# Patient Record
Sex: MALE | Race: BLACK OR AFRICAN AMERICAN
[De-identification: names, ages, dates, MRNs, and addresses within clinical notes are randomized per-mention and may not be internally consistent; named-entity substitution may affect disease eponyms.]

---

## 2018-10-03 ENCOUNTER — HOSPITAL ENCOUNTER (OUTPATIENT)
Dept: HOSPITAL 35 - PAIN | Age: 53
Discharge: HOME | End: 2018-10-03
Attending: ANESTHESIOLOGY
Payer: OTHER GOVERNMENT

## 2018-10-03 VITALS — SYSTOLIC BLOOD PRESSURE: 131 MMHG | DIASTOLIC BLOOD PRESSURE: 90 MMHG

## 2018-10-03 VITALS — WEIGHT: 200.2 LBS | BODY MASS INDEX: 28.66 KG/M2 | HEIGHT: 70 IN

## 2018-10-03 DIAGNOSIS — G89.29: ICD-10-CM

## 2018-10-03 DIAGNOSIS — Z79.899: ICD-10-CM

## 2018-10-03 DIAGNOSIS — M51.16: Primary | ICD-10-CM

## 2018-10-03 DIAGNOSIS — M47.27: ICD-10-CM

## 2018-10-03 DIAGNOSIS — M46.96: ICD-10-CM

## 2019-02-05 ENCOUNTER — HOSPITAL ENCOUNTER (OUTPATIENT)
Dept: HOSPITAL 35 - PAIN | Age: 54
Discharge: HOME | End: 2019-02-05
Attending: ANESTHESIOLOGY
Payer: OTHER GOVERNMENT

## 2019-02-05 VITALS — SYSTOLIC BLOOD PRESSURE: 123 MMHG | DIASTOLIC BLOOD PRESSURE: 82 MMHG

## 2019-02-05 VITALS — WEIGHT: 203.2 LBS | HEIGHT: 70 IN | BODY MASS INDEX: 29.09 KG/M2

## 2019-02-05 DIAGNOSIS — Z79.899: ICD-10-CM

## 2019-02-05 DIAGNOSIS — M50.10: Primary | ICD-10-CM

## 2019-02-05 DIAGNOSIS — Z98.890: ICD-10-CM

## 2019-02-05 DIAGNOSIS — M48.02: ICD-10-CM

## 2019-02-05 DIAGNOSIS — M47.22: ICD-10-CM

## 2019-02-05 NOTE — NUR
Pain Clinic Assessment:
 
1. History of Osteoarthritis:
Not Applicable
   History of Rheumatoid Arthritis:
Not Applicable
 
2. Height: 5 ft. 10 in. 177.8 cm.
   Weight: 203.2 lb.  oz. 92.171 kg.
   Patient's BMI: 29.2
 
3. Vital Signs:
   BP: 123/82 Pulse: 70 Resp: 14
   Temp:  02 Sat: 100 ECG Mon:
 
4. Pain Intensity: 4
 
5. Fall Risk:
   Dizziness: Y  Needs help standing or walking: N
   Fallen in the last 3 months: Y
   Fall risk comments:
 
 
6. Patient on Blood Thinner: None
 
7. History of Hypertension: N
 
8. Opioid Therapy greater than 6 weeks: N
   Opiate Contract Signed:
 
9. Risk Assessment Tool Provided:
 
10. Functional Assessment Tool:
 
11. Recreational Drug Use: Never Drug Type:
    Tobacco Use: Never Smoker Tobacco Type:
       Amount or Packs/day:  How Many Years:
    Alcohol Use: Yes  Frequency:  Quant:

## 2019-02-06 NOTE — HPC
[facility]
[place]   [address]                     PAIN MANAGEMENT CONSULTATION  
_______________________________________________________________________________
 
Name:       ISABELL EDGAR       Room #:                     REG [facility] 
MNAEEM.#:      [id number]                       Account #:      [id number]  
Admission:  02/05/19    Attend Phys:    Santos Ledesma DO  
Discharge:              Date of Birth:  [birth year]  
                                                          Report #: [id number]
                                                                    [id number]   
_______________________________________________________________________________
THIS REPORT FOR:   //name//                          
 
CC: Dr. Lisa RODRIGUEZ
 
REFERRING PHYSICIAN:  Dr. Rosina Rodriguez at [facility]. 
 
HISTORY OF PRESENT ILLNESS:  As you know, the patient is a 53-year-old male who
was initially seen in our clinic for concerns of low back pain for which the
patient underwent treatment with epidural injections on 08/15/2018 and again on
10/03/2018 with improvement in symptoms.  He has been lost followup visit until
today 02/05/2019, returning in followup visit per the request of his
neurosurgeon to undergo a cervical epidural injection under fluoroscopic
guidance to address cervical radicular symptoms secondary to a displaced large
left eccentric central disk protrusion at the C5-C6 level.  The patient denies
injury or trauma that may have led to symptom development.  He returns today per
the request of his neurosurgeon to undergo cervical epidural injection under
fluoroscopic guidance to address cervical radicular symptoms.
 
ALLERGIES:  No known drug allergies.
 
CURRENT MEDICATIONS:  Gabapentin, trazodone, tizanidine, nortriptyline,
meloxicam, lidocaine, cholecalciferol, acetaminophen and duloxetine.
 
SOCIAL HISTORY:  The patient denies tobacco, IV or illicit drug use.  Admits to
occasional alcohol beverage.  He is working, not receiving workmen's
compensation, unaccompanied today.
 
IMAGING DATA:  MRI of the cervical spine obtained on 09/26/2018 shows moderate
focal disk protrusion at the C4-C5 level with mild left uncovertebral spurring,
mild left neural foraminal stenosis, mild central canal stenosis at C5-C6, there
is a large left eccentric central disk protrusion, left greater than right
uncovertebral spurring, protrusion abuts the ventral surface of the spinal cord
which slightly deforms and contributes to moderate central canal stenosis. 
Dural sac is measured at 8.5 mm, moderate left neural foraminal stenosis with
minimal right neural foraminal stenosis.  There is also mild disk bulge at
C6-C7, minimal left neural foraminal narrowing, right foramen is patent,
borderline spinal canal stenosis; C7-T1, moderate central disk protrusion,
ventral surface of the cord is abutted but only contributes to mild spinal canal
stenosis at 9 mm.
 
PHYSICAL EXAMINATION:
VITAL SIGNS:  Blood pressure 123/82, pulse 70 and respiratory rate 14 and
unlabored.  The patient is 100% on room air.  Height 5 feet 10 inches tall,
 
 
 
[place] [address]                     PAIN MANAGEMENT CONSULTATION  
_______________________________________________________________________________
 
Name:       ISABELL EDGAR       Room #:                     REG CL 
M.CHANG.#:      [id number]                       Account #:      [id number]  
Admission:  02/05/19    Attend Phys:    Santos Ledesma DO  
Discharge:              Date of Birth:  [birth year]  
                                                          Report #: [id number]
                                                                    [id number]   
_______________________________________________________________________________
weight 203.2 pounds and BMI calculated 29.2.
GENERAL:  Well-developed, well-nourished, well-hydrated 53-year-old male
appearing stated age, placing current pain score 4/10.
HEENT:  Normocephalic and atraumatic.  Pupils equal, round and reactive to
light.  Extraocular muscles are intact.  Sclerae nonicteric without injection.
NEUROLOGICAL:  Cranial nerves 2 through 12 grossly intact.  Speech is fluent. 
The patient deemed a good historian.
LUNGS:  Clear.  No wheeze, rhonchi or rales.
CARDIOVASCULAR:  Regular.  No appreciable gallop and no rub.
ABDOMEN:  Soft.
EXTREMITIES:  Show no clubbing, no cyanosis and no edema.
MUSCULOSKELETAL:  Upper extremity strength appears symmetrical 5/5, muscle bulk
and tone equal and symmetrical in comparing left upper extremity, right.  Deep
tendon reflexes at biceps, brachialis and triceps equal and symmetrical.  He is
intact to light touch from C5-T1 dermatomes.  Muscle bulk and tone is equal and
symmetrical in comparing left upper extremity to right.  Spurling's test
positive left, negative right.  Cervical provocation testing is met with
increasing pain, specifically with extension, rotation to the left.
 
ASSESSMENT:
1.  Cervical radiculopathy.
2.  Cervical spinal stenosis.
3.  Displacement of the cervical intervertebral disk with radiculopathy.
4.  Cervical spondylosis with radiculopathy.
 
PLAN:
1.  The patient has been [facility] to our [facility] and [facility] for suspected cervical radiculopathy.  The
findings on the physical exam in conjunction with the findings of his MRI of the
cervical spine do correlate to cervical radiculopathy.  We have discussed with
the patient the treatment options for cervical radiculopathy today.  This
discussion involved the following.  
We discussed physical therapy, stretching exercises and traction techniques as a
conservative option for treatment.  We discussed medication management,
escalating his dose to gabapentin and utilizing a possible increase in
nortriptyline to gain better neuropathic pain control.  We discussed the
requested cervical epidural injection under fluoroscopic guidance and
ultimately, the surgical options that may be necessary to address the findings
at the C5-C6 level.  After reviewing the risks and benefits of all proposed
treatment options, the patient chose to move forward with a cervical epidural
injection under fluoroscopic guidance.
2.  The patient was advised risks and benefits of a cervical epidural injection.
 These risks include but are not necessarily limited to bleeding, bruising,
infection, worsening pain, no relief of pain, also risk of temporary or
permanent muscle weakness, temporary or permanent nerve damage, possible
paralysis and death.  The patient states understood and wished to proceed.
 
 
 
[address]
[place]   [address]                     PAIN MANAGEMENT CONSULTATION  
_______________________________________________________________________________
 
Name:       ISABELL EDGAR       Room #:                     REG JOVANI MOORE#:      [id number]                       Account #:      [id number]  
Admission:  02/05/19    Attend Phys:    Santos Ledesma DO  
Discharge:              Date of Birth:  [birth year]  
                                                          Report #: [id number]
                                                                    [id number]   
_______________________________________________________________________________
3.  No medication changes made at today's visit.  The patient will continue
current medical therapy as previously prescribed.
4.  We will see the patient back in followup visit on an as needed basis for the
possible next in the series of cervical epidural injections under fluoroscopic
guidance.  
5.  We wish to thank the neurosurgeon for the referral of the patient back to
our clinic to address her cervical radicular symptoms.  We will keep you
apprised of his response to treatment as we address cervical radiculopathy.
 
PROCEDURE NOTE
 
DESCRIPTION OF PROCEDURE:  C7-T1 cervical epidural steroid injection under
fluoroscopic guidance.
 
This is the first procedure of the first series that the patient is undergoing.
 
After obtaining written consent, the patient was taken back to the fluoroscopy
suite and placed in a prone position with separate pillows under chest and
forehead to decrease cervical lordosis.  The skin overlying the cervical area
was prepped and draped in an aseptic fashion.  The C7-T1 vertebral interspace
was identified by AP fluoroscopy.  The skin and subcutaneous tissue overlying
the target site of injection was anesthetized using 3 mL of 1% lidocaine.
 
A 20-gauge 3-1/2 inch Tuohy needle was advanced under fluoroscopic guidance
toward the epidural space using a midline approach.  The epidural space was
identified using a loss of resistance to air technique.  After negative
aspiration for heme or cerebrospinal fluid, a total of 1 mL of Omnipaque was
injected.  A cervical epidurogram was confirmed using AP and oblique
fluoroscopy.  After negative aspiration for heme or cerebrospinal fluid, 5 mL of
a solution containing 2 mL 40 mg per mL, 80 mg total triamcinolone, 3 mL
lidocaine 1% was injected in increments.  Contrast spread was noted from
posterior epidural space.  The needle was then retracted approximately FPC
and the needle track was flushed with 1 mL of 1% lidocaine.  There were no
apparent new sensory deficits in the upper extremities present following the
procedure.  A sterile bandage was placed over the injection site.
 
The heart rate, pulse oximetry and blood pressure were continuously monitored
after the procedure.  There were no apparent complications.  The patient
tolerated the procedure well and was carefully escorted in the recovery room in
stable condition.  After meeting discharge criteria, the patient was discharged
home.
 
 
 
  <ELECTRONICALLY SIGNED>
   By: Santos Ledesma DO          
  02/06/19     1054
D: 02/06/19 0829                           _____________________________________
T: 02/06/19 0932                           Santos Ledesma DO            /nt

## 2019-02-27 ENCOUNTER — HOSPITAL ENCOUNTER (OUTPATIENT)
Dept: HOSPITAL 35 - PAIN | Age: 54
Discharge: HOME | End: 2019-02-27
Attending: ANESTHESIOLOGY
Payer: OTHER GOVERNMENT

## 2019-02-27 VITALS — DIASTOLIC BLOOD PRESSURE: 89 MMHG | SYSTOLIC BLOOD PRESSURE: 136 MMHG

## 2019-02-27 VITALS — WEIGHT: 200.2 LBS | BODY MASS INDEX: 29.65 KG/M2 | HEIGHT: 69.02 IN

## 2019-02-27 DIAGNOSIS — Z79.899: ICD-10-CM

## 2019-02-27 DIAGNOSIS — M47.22: ICD-10-CM

## 2019-02-27 DIAGNOSIS — M50.10: Primary | ICD-10-CM

## 2019-02-27 DIAGNOSIS — Z98.890: ICD-10-CM

## 2019-02-27 DIAGNOSIS — M48.02: ICD-10-CM

## 2019-02-27 NOTE — NUR
Pain Clinic Assessment:
 
1. History of Osteoarthritis:
Not Applicable
   History of Rheumatoid Arthritis:
Not Applicable
 
2. Height: 5 ft. 9 in. 175.3 cm.
   Weight: 200.2 lb.  oz. 90.810 kg.
   Patient's BMI: 6.1
 
3. Vital Signs:
   BP: 136/89 Pulse: 84 Resp: 18
   Temp:  02 Sat: 100 ECG Mon:
 
4. Pain Intensity: 7
 
5. Fall Risk:
   Dizziness:   Needs help standing or walking:
   Fallen in the last 3 months:
   Fall risk comments:
 
 
6. Patient on Blood Thinner: None
 
7. History of Hypertension: N
 
8. Opioid Therapy greater than 6 weeks: N
   Opiate Contract Signed:
 
9. Risk Assessment Tool Provided:
 
10. Functional Assessment Tool:
 
11. Recreational Drug Use: Never Drug Type:
    Tobacco Use: Never Smoker Tobacco Type:
       Amount or Packs/day:  How Many Years:
    Alcohol Use: Yes  Frequency:  Quant: